# Patient Record
Sex: MALE | Race: BLACK OR AFRICAN AMERICAN | NOT HISPANIC OR LATINO | Employment: FULL TIME | URBAN - METROPOLITAN AREA
[De-identification: names, ages, dates, MRNs, and addresses within clinical notes are randomized per-mention and may not be internally consistent; named-entity substitution may affect disease eponyms.]

---

## 2024-10-31 ENCOUNTER — HOSPITAL ENCOUNTER (EMERGENCY)
Facility: HOSPITAL | Age: 49
Discharge: HOME | End: 2024-10-31
Attending: EMERGENCY MEDICINE
Payer: COMMERCIAL

## 2024-10-31 VITALS
HEIGHT: 69 IN | BODY MASS INDEX: 25.92 KG/M2 | TEMPERATURE: 99 F | SYSTOLIC BLOOD PRESSURE: 156 MMHG | DIASTOLIC BLOOD PRESSURE: 68 MMHG | RESPIRATION RATE: 18 BRPM | WEIGHT: 175 LBS | HEART RATE: 69 BPM | OXYGEN SATURATION: 97 %

## 2024-10-31 DIAGNOSIS — R52 PAIN: ICD-10-CM

## 2024-10-31 DIAGNOSIS — S40.011A CONTUSION OF RIGHT SHOULDER, INITIAL ENCOUNTER: Primary | ICD-10-CM

## 2024-10-31 LAB
HCV AB SERPL QL IA: NORMAL
HIV 1+2 AB+HIV1 P24 AG SERPL QL IA: NORMAL

## 2024-10-31 PROCEDURE — 73030 X-RAY EXAM OF SHOULDER: CPT | Mod: TC,RT

## 2024-10-31 PROCEDURE — 73030 X-RAY EXAM OF SHOULDER: CPT | Mod: 26,RT,, | Performed by: RADIOLOGY

## 2024-10-31 PROCEDURE — 87389 HIV-1 AG W/HIV-1&-2 AB AG IA: CPT | Performed by: EMERGENCY MEDICINE

## 2024-10-31 PROCEDURE — 86803 HEPATITIS C AB TEST: CPT | Performed by: EMERGENCY MEDICINE

## 2024-10-31 PROCEDURE — 96372 THER/PROPH/DIAG INJ SC/IM: CPT | Performed by: EMERGENCY MEDICINE

## 2024-10-31 PROCEDURE — 63600175 PHARM REV CODE 636 W HCPCS: Performed by: EMERGENCY MEDICINE

## 2024-10-31 PROCEDURE — 99284 EMERGENCY DEPT VISIT MOD MDM: CPT | Mod: 25

## 2024-10-31 RX ORDER — HYDROCODONE BITARTRATE AND ACETAMINOPHEN 5; 325 MG/1; MG/1
1 TABLET ORAL EVERY 4 HOURS PRN
Qty: 8 TABLET | Refills: 0 | Status: SHIPPED | OUTPATIENT
Start: 2024-10-31

## 2024-10-31 RX ORDER — ORPHENADRINE CITRATE 30 MG/ML
60 INJECTION INTRAMUSCULAR; INTRAVENOUS
Status: COMPLETED | OUTPATIENT
Start: 2024-10-31 | End: 2024-10-31

## 2024-10-31 RX ORDER — ORPHENADRINE CITRATE 100 MG/1
100 TABLET, EXTENDED RELEASE ORAL 2 TIMES DAILY
Qty: 20 TABLET | Refills: 0 | Status: SHIPPED | OUTPATIENT
Start: 2024-10-31 | End: 2024-11-10

## 2024-10-31 RX ORDER — KETOROLAC TROMETHAMINE 30 MG/ML
60 INJECTION, SOLUTION INTRAMUSCULAR; INTRAVENOUS
Status: COMPLETED | OUTPATIENT
Start: 2024-10-31 | End: 2024-10-31

## 2024-10-31 RX ADMIN — ORPHENADRINE CITRATE 60 MG: 30 INJECTION, SOLUTION INTRAMUSCULAR; INTRAVENOUS at 09:10

## 2024-10-31 RX ADMIN — KETOROLAC TROMETHAMINE 60 MG: 30 INJECTION, SOLUTION INTRAMUSCULAR; INTRAVENOUS at 09:10

## 2024-10-31 NOTE — Clinical Note
"Marcial Rinaldion" Ivonne was seen and treated in our emergency department on 10/31/2024.  He may return to work on 11/01/2024.       If you have any questions or concerns, please don't hesitate to call.      Mendoza SOLOMON RN    "

## 2024-10-31 NOTE — Clinical Note
"Marcial Rinaldion" Ivonne was seen and treated in our emergency department on 10/31/2024.  He may return to work on 10/31/2024.       If you have any questions or concerns, please don't hesitate to call.      Mendoza SOLOMON RN    "

## 2024-10-31 NOTE — DISCHARGE INSTRUCTIONS
As we discussed, your x-ray shows no evidence of fracture or dislocation.  You are probably having pain from a bad bruise or maybe muscle strain or tear.  Take over-the-counter Tylenol and Motrin for this.  For unrelieved pain, use orphenadrine, which is a muscle relaxer, and Norco, which is a pain reliever.  Do not drive or drink alcohol after taking orphenadrine or Norco.  Follow-up with your primary care doctor, and if your symptoms continue for more than one-week, follow-up with Dr. Spear, the orthopedist.  Return here as needed or if worse in any way.

## 2024-10-31 NOTE — ED NOTES
Pt sitting up on side of bed. Affect is calm. Pt grimacing. Pt fell at 2am this morning and injured right shoulder. Pt with complaint of consistent right shoulder pain radiating up neck. Limited ROM to right arm. Pt reports pain is 9 on 0/10 pain scale. Palpated  pulse 2+. Pt awaiting provider.

## 2024-11-02 NOTE — ED PROVIDER NOTES
Encounter Date: 10/31/2024       History     Chief Complaint   Patient presents with    Shoulder Injury     Pt. C/o right shoulder pain. States he was trying to sit down when he missed the chair and braced himself with the right hand. Pt. C/o worsening shoulder pain since.     49-year-old male, here from home via private vehicle for evaluation and treatment of right shoulder pain after a fall.  Patient states he was sitting into a chair when he missed the chair and fell backward onto his right shoulder.  He complains of generalized pain throughout the shoulder, mostly in the posterior aspect.  Patient has not tried any medications at home prior to coming here.  Denies any numbness, tingling, or weakness in the extremity.  Denies neck or back pain.      Review of patient's allergies indicates:  No Known Allergies  History reviewed. No pertinent past medical history.  History reviewed. No pertinent surgical history.  No family history on file.  Social History     Tobacco Use    Smoking status: Every Day     Current packs/day: 0.50     Types: Cigarettes    Smokeless tobacco: Never   Substance Use Topics    Alcohol use: Yes    Drug use: Yes     Types: Marijuana     Review of Systems   Musculoskeletal:  Positive for arthralgias.   All other systems reviewed and are negative.      Physical Exam     Initial Vitals [10/31/24 0810]   BP Pulse Resp Temp SpO2   (!) 176/96 71 18 98.7 °F (37.1 °C) 97 %      MAP       --         Physical Exam    Nursing note and vitals reviewed.  Constitutional: He appears well-developed and well-nourished. No distress.   HENT:   Head: Normocephalic and atraumatic.   Nose: Nose normal. Mouth/Throat: Oropharynx is clear and moist.   Eyes: Conjunctivae and EOM are normal. Pupils are equal, round, and reactive to light. No scleral icterus.   Neck: Neck supple. No JVD present.   Normal range of motion.  Cardiovascular:  Normal rate, regular rhythm, normal heart sounds and intact distal pulses.            No murmur heard.  Pulmonary/Chest: Breath sounds normal. No stridor. No respiratory distress. He has no wheezes.   Abdominal: Abdomen is soft. Bowel sounds are normal. He exhibits no distension. There is no abdominal tenderness.   Musculoskeletal:         General: Tenderness present. No edema. Normal range of motion.      Cervical back: Normal range of motion and neck supple.      Comments: Patient describes mild to moderate tenderness to palpation throughout the right shoulder region.  No obvious deformity, no edema, no abrasions or lacerations.  No erythema, ecchymosis, or excess warmth.  Good range of motion both passive and active.  Distal pulses, sensory, and motor are normal in the extremity.  Compartments are soft.           ED Course   Procedures  Labs Reviewed   HIV 1 / 2 ANTIBODY       Result Value    HIV 1/2 Ag/Ab Non-reactive      Narrative:     Release to patient->Immediate   HEPATITIS C ANTIBODY    Hepatitis C Ab Non-reactive      Narrative:     Release to patient->Immediate          Imaging Results              X-Ray Shoulder Complete 2 View Right (Final result)  Result time 10/31/24 09:25:50      Final result by Charisse Perez MD (10/31/24 09:25:50)                   Impression:      No acute abnormality.      Electronically signed by: Charisse Perez  Date:    10/31/2024  Time:    09:25               Narrative:    EXAMINATION:  XR SHOULDER COMPLETE 2 OR MORE VIEWS RIGHT    CLINICAL HISTORY:  Pain, unspecified    TECHNIQUE:  Two or three views of the right shoulder were performed.    COMPARISON:  None    FINDINGS:  There is no fracture or dislocation at the acromioclavicular or glenohumeral joint.  Visualized right-sided ribs are intact.  Minor degenerative changes are noted.                                    X-Rays:   Independently Interpreted Readings:   Other Readings:  X-ray of the right shoulder, personally reviewed by me shows no evidence of fracture or dislocation.    Medications    ketorolac injection 60 mg (60 mg Intramuscular Given 10/31/24 0927)   orphenadrine injection 60 mg (60 mg Intramuscular Given 10/31/24 0927)     Medical Decision Making  Differential includes contusion, fracture, dislocation, sprain, strain, etc.    While here, patient was given an injection of Toradol and Robaxin.  He reports good relief of symptoms.  X-ray negative for any fracture or dislocation.  Likely contusion, but other soft tissue injury such as rotator cuff injury or not ruled out.  Patient was told all of this and understands.  He will follow-up with orthopedist.  Will discharge home with medications to supplement over-the-counter Tylenol and Motrin.  He will follow-up with orthopedist if symptoms have not improved after 2-3 days.    Amount and/or Complexity of Data Reviewed  Labs: ordered.  Radiology: ordered.    Risk  Prescription drug management.                                      Clinical Impression:  Final diagnoses:  [R52] Pain  [S40.011A] Contusion of right shoulder, initial encounter (Primary)          ED Disposition Condition    Discharge Stable          ED Prescriptions       Medication Sig Dispense Start Date End Date Auth. Provider    orphenadrine (NORFLEX) 100 mg tablet Take 1 tablet (100 mg total) by mouth 2 (two) times daily. for 10 days 20 tablet 10/31/2024 11/10/2024 Simon Mena MD    HYDROcodone-acetaminophen (NORCO) 5-325 mg per tablet Take 1 tablet by mouth every 4 (four) hours as needed. 8 tablet 10/31/2024 -- Simon Mena MD          Follow-up Information       Follow up With Specialties Details Why Contact Info    Your primary care doctor  Schedule an appointment as soon as possible for a visit       Jose Spear MD Orthopedic Surgery Schedule an appointment as soon as possible for a visit  As needed [address] [facility]  [address]  [place] [address]  [phone]      [facility] Dept Emergency Medicine  As needed, If symptoms worsen [address]  [facility] [address]  [phone]             Simon Mena MD  11/02/24 0816